# Patient Record
Sex: FEMALE | Race: BLACK OR AFRICAN AMERICAN | Employment: FULL TIME | URBAN - METROPOLITAN AREA
[De-identification: names, ages, dates, MRNs, and addresses within clinical notes are randomized per-mention and may not be internally consistent; named-entity substitution may affect disease eponyms.]

---

## 2021-12-31 ENCOUNTER — HOSPITAL ENCOUNTER (EMERGENCY)
Age: 31
Discharge: HOME | End: 2021-12-31
Attending: EMERGENCY MEDICINE
Payer: COMMERCIAL

## 2021-12-31 ENCOUNTER — APPOINTMENT (OUTPATIENT)
Dept: ULTRASOUND IMAGING | Age: 31
End: 2021-12-31
Attending: PHYSICIAN ASSISTANT
Payer: COMMERCIAL

## 2021-12-31 VITALS
DIASTOLIC BLOOD PRESSURE: 82 MMHG | HEIGHT: 68 IN | RESPIRATION RATE: 16 BRPM | TEMPERATURE: 97.8 F | BODY MASS INDEX: 32.58 KG/M2 | WEIGHT: 215 LBS | SYSTOLIC BLOOD PRESSURE: 120 MMHG | OXYGEN SATURATION: 100 % | HEART RATE: 88 BPM

## 2021-12-31 DIAGNOSIS — N93.9 ABNORMAL UTERINE BLEEDING: Primary | ICD-10-CM

## 2021-12-31 DIAGNOSIS — R10.2 PELVIC PAIN: ICD-10-CM

## 2021-12-31 LAB
ABO + RH BLD: NORMAL
ANION GAP SERPL CALC-SCNC: 7 MMOL/L (ref 3–18)
BASOPHILS # BLD: 0 K/UL (ref 0–0.1)
BASOPHILS NFR BLD: 1 % (ref 0–2)
BLOOD GROUP ANTIBODIES SERPL: NORMAL
BUN SERPL-MCNC: 16 MG/DL (ref 7–18)
BUN/CREAT SERPL: 13 (ref 12–20)
CALCIUM SERPL-MCNC: 8.7 MG/DL (ref 8.5–10.1)
CHLORIDE SERPL-SCNC: 111 MMOL/L (ref 100–111)
CO2 SERPL-SCNC: 21 MMOL/L (ref 21–32)
CREAT SERPL-MCNC: 1.22 MG/DL (ref 0.6–1.3)
DIFFERENTIAL METHOD BLD: NORMAL
EOSINOPHIL # BLD: 0.1 K/UL (ref 0–0.4)
EOSINOPHIL NFR BLD: 1 % (ref 0–5)
ERYTHROCYTE [DISTWIDTH] IN BLOOD BY AUTOMATED COUNT: 14.5 % (ref 11.6–14.5)
GLUCOSE SERPL-MCNC: 85 MG/DL (ref 74–99)
HCG SERPL QL: NEGATIVE
HCT VFR BLD AUTO: 38.8 % (ref 35–45)
HGB BLD-MCNC: 13.1 G/DL (ref 12–16)
IMM GRANULOCYTES # BLD AUTO: 0 K/UL (ref 0–0.04)
IMM GRANULOCYTES NFR BLD AUTO: 0 % (ref 0–0.5)
LYMPHOCYTES # BLD: 3.3 K/UL (ref 0.9–3.6)
LYMPHOCYTES NFR BLD: 42 % (ref 21–52)
MCH RBC QN AUTO: 28.7 PG (ref 24–34)
MCHC RBC AUTO-ENTMCNC: 33.8 G/DL (ref 31–37)
MCV RBC AUTO: 85.1 FL (ref 78–100)
MONOCYTES # BLD: 0.5 K/UL (ref 0.05–1.2)
MONOCYTES NFR BLD: 7 % (ref 3–10)
NEUTS SEG # BLD: 3.9 K/UL (ref 1.8–8)
NEUTS SEG NFR BLD: 50 % (ref 40–73)
NRBC # BLD: 0 K/UL (ref 0–0.01)
NRBC BLD-RTO: 0 PER 100 WBC
PLATELET # BLD AUTO: 408 K/UL (ref 135–420)
PMV BLD AUTO: 9.8 FL (ref 9.2–11.8)
POTASSIUM SERPL-SCNC: 3.4 MMOL/L (ref 3.5–5.5)
RBC # BLD AUTO: 4.56 M/UL (ref 4.2–5.3)
SODIUM SERPL-SCNC: 139 MMOL/L (ref 136–145)
SPECIMEN EXP DATE BLD: NORMAL
WBC # BLD AUTO: 7.8 K/UL (ref 4.6–13.2)

## 2021-12-31 PROCEDURE — 85025 COMPLETE CBC W/AUTO DIFF WBC: CPT

## 2021-12-31 PROCEDURE — 84703 CHORIONIC GONADOTROPIN ASSAY: CPT

## 2021-12-31 PROCEDURE — 99283 EMERGENCY DEPT VISIT LOW MDM: CPT

## 2021-12-31 PROCEDURE — 86900 BLOOD TYPING SEROLOGIC ABO: CPT

## 2021-12-31 PROCEDURE — 80048 BASIC METABOLIC PNL TOTAL CA: CPT

## 2021-12-31 PROCEDURE — 76856 US EXAM PELVIC COMPLETE: CPT

## 2021-12-31 PROCEDURE — 74011250636 HC RX REV CODE- 250/636: Performed by: PHYSICIAN ASSISTANT

## 2021-12-31 PROCEDURE — 87591 N.GONORRHOEAE DNA AMP PROB: CPT

## 2021-12-31 PROCEDURE — 96374 THER/PROPH/DIAG INJ IV PUSH: CPT

## 2021-12-31 PROCEDURE — 74011250637 HC RX REV CODE- 250/637: Performed by: PHYSICIAN ASSISTANT

## 2021-12-31 RX ORDER — KETOROLAC TROMETHAMINE 15 MG/ML
15 INJECTION, SOLUTION INTRAMUSCULAR; INTRAVENOUS
Status: COMPLETED | OUTPATIENT
Start: 2021-12-31 | End: 2021-12-31

## 2021-12-31 RX ORDER — ACETAMINOPHEN 325 MG/1
650 TABLET ORAL
Status: COMPLETED | OUTPATIENT
Start: 2021-12-31 | End: 2021-12-31

## 2021-12-31 RX ORDER — ERGOCALCIFEROL 1.25 MG/1
50000 CAPSULE ORAL
COMMUNITY

## 2021-12-31 RX ORDER — TOPIRAMATE 50 MG/1
TABLET, FILM COATED ORAL 2 TIMES DAILY
COMMUNITY

## 2021-12-31 RX ADMIN — KETOROLAC TROMETHAMINE 15 MG: 15 INJECTION, SOLUTION INTRAMUSCULAR; INTRAVENOUS at 19:19

## 2021-12-31 RX ADMIN — SODIUM CHLORIDE 1000 ML: 9 INJECTION, SOLUTION INTRAVENOUS at 17:50

## 2021-12-31 RX ADMIN — ACETAMINOPHEN 650 MG: 325 TABLET ORAL at 17:53

## 2021-12-31 NOTE — ED PROVIDER NOTES
EMERGENCY DEPARTMENT HISTORY AND PHYSICAL EXAM    Date: 12/31/2021  Patient Name: Winifred Navarro    History of Presenting Illness     Chief Complaint   Patient presents with    Vaginal Bleeding         History Provided By: Patient    5:20 PM  Winifred Navarro is a 27 y.o. female with PMHX of PCOS who presents to the emergency department C/O heavy vaginal bleeding cramping which began yesterday and worsened today with associated nausea. Had mild diarrhea yesterday. Patient reports history of PCOS, has very irregular periods. Over the last 6 months she has had very frequent brownish-red spotting, saw her PCP for this and completed a 10-day course of progesterone 10 mg which she finished 3 days ago. She has not had intercourse for 3 months. She states she has an appointment with OB/GYN Dr. Miguel Ángel London on 1/6/2022. Pt denies fever, dysuria, and any other sxs or complaints. PCP: Nima, MD Gold    Current Outpatient Medications   Medication Sig Dispense Refill    topiramate (Topamax) 50 mg tablet Take  by mouth two (2) times a day.  ergocalciferol (Vitamin D2) 1,250 mcg (50,000 unit) capsule Take 50,000 Units by mouth. Past History     Past Medical History:  Past Medical History:   Diagnosis Date    Migraine        Past Surgical History:  History reviewed. No pertinent surgical history. Family History:  History reviewed. No pertinent family history. Social History:  Social History     Tobacco Use    Smoking status: Never Smoker    Smokeless tobacco: Never Used   Substance Use Topics    Alcohol use: Yes     Comment: socially    Drug use: Not Currently       Allergies:  No Known Allergies      Review of Systems   Review of Systems   Constitutional: Negative for fever. Gastrointestinal: Positive for abdominal pain. Genitourinary: Positive for pelvic pain and vaginal bleeding. Negative for dysuria. All other systems reviewed and are negative.         Physical Exam     Vitals: 12/31/21 1653 12/31/21 1833 12/31/21 2115   BP: (!) 144/103 115/86 120/82   Pulse: 80 77 88   Resp: 17 18 16   Temp: 97.8 °F (36.6 °C)     SpO2: 99% 100% 100%   Weight: 97.5 kg (215 lb)     Height: 5' 8\" (1.727 m)       Physical Exam  Vital signs and nursing notes reviewed. CONSTITUTIONAL: Alert. Well-appearing; well-nourished; appears uncomfortable, tearful in moderate pain distress. HEAD: Normocephalic; atraumatic. CV: Normal S1, S2; no murmurs, rubs, or gallops. No chest wall tenderness. RESPIRATORY: Normal chest excursion with respiration; breath sounds clear and equal bilaterally; no wheezes, rhonchi, or rales. GI: Normal bowel sounds; non-distended; generalized lower abdominal tenderness.; no guarding or rigidity; no palpable organomegaly. No CVA tenderness. PELVIC: Normal external genitalia, without rash or lesions. Speculum exam: Normal appearing cervix. Moderate blood, no other discharge noted. Vaginal walls without lesion. NEURO: A & O x3. PSYCH:  Mood and affect appropriate. Diagnostic Study Results     Labs -     No results found for this or any previous visit (from the past 12 hour(s)). Radiologic Studies   US PELV NON OB W TV W DOPPLER   Final Result      No significant abnormality. No findings to explain pain. CT Results  (Last 48 hours)    None        CXR Results  (Last 48 hours)    None          Medications given in the ED-  Medications   sodium chloride 0.9 % bolus infusion 1,000 mL (0 mL IntraVENous IV Completed 12/31/21 2022)   acetaminophen (TYLENOL) tablet 650 mg (650 mg Oral Given 12/31/21 1753)   ketorolac (TORADOL) injection 15 mg (15 mg IntraVENous Given 12/31/21 1919)         Medical Decision Making   I am the first provider for this patient. I reviewed the vital signs, available nursing notes, past medical history, past surgical history, family history and social history. Vital Signs-Reviewed the patient's vital signs.     Records Reviewed: Nursing Notes      Procedures:  Procedures    ED Course:  5:20 PM   Initial assessment performed. The patients presenting problems have been discussed, and they are in agreement with the care plan formulated and outlined with them. I have encouraged them to ask questions as they arise throughout their visit. 7:53 PM  Patient's presentation, labs/imaging and plan of care was reviewed with Ritika Fontenot PA-C as part of sign out. They will await pelvic US and reassess as part of the plan discussed with the patient. SARA Lopez's assistance in completion of this plan is greatly appreciated but it should be noted that I will be the provider of record for this patient. Written by SARA Lim    Patient's US came back negative. Patient informed of result. Patient has a scheduled appointment to see OBGYN soon regarding her vaginal bleeding. Diagnosis and Disposition       DISCHARGE NOTE:    Tonia Byers's  results have been reviewed with her. She has been counseled regarding her diagnosis, treatment, and plan. She verbally conveys understanding and agreement of the signs, symptoms, diagnosis, treatment and prognosis and additionally agrees to follow up as discussed. She also agrees with the care-plan and conveys that all of her questions have been answered. I have also provided discharge instructions for her that include: educational information regarding their diagnosis and treatment, and list of reasons why they would want to return to the ED prior to their follow-up appointment, should her condition change. She has been provided with education for proper emergency department utilization. CLINICAL IMPRESSION:    1. Abnormal uterine bleeding    2. Pelvic pain        PLAN:  1. D/C Home  2. Discharge Medication List as of 12/31/2021  9:10 PM        3.    Follow-up Information     Follow up With Specialties Details Why Contact Info    OB/GYN   Keep appointment with OB/GYN _______________________________      Please note that this dictation was completed with [employer], the computer voice recognition software. Quite often unanticipated grammatical, syntax, homophones, and other interpretive errors are inadvertently transcribed by the computer software. Please disregard these errors. Please excuse any errors that have escaped final proofreading.

## 2021-12-31 NOTE — ED NOTES
Report given to [facility] ([facility]). All questions answered.

## 2021-12-31 NOTE — ED TRIAGE NOTES
Ambulatory patient arrives with complaints of abnormal menstrual cycle that began in June and is currently wearing super tampons and pads, changing every hour, c/o pain

## 2022-01-01 NOTE — DISCHARGE INSTRUCTIONS
Pelvic rest  Tylenol/Motrin for pain  Keep appointment with OB/GYN on January 6, 2022  Worse?   Return to ER

## 2022-01-13 LAB
C TRACH RRNA SPEC QL NAA+PROBE: NEGATIVE
N GONORRHOEA RRNA SPEC QL NAA+PROBE: NEGATIVE
SPECIMEN SOURCE: NORMAL
T VAGINALIS RRNA SPEC QL NAA+PROBE: NEGATIVE

## 2023-01-07 ENCOUNTER — APPOINTMENT (OUTPATIENT)
Dept: CT IMAGING | Age: 33
End: 2023-01-07
Attending: PHYSICIAN ASSISTANT
Payer: COMMERCIAL

## 2023-01-07 ENCOUNTER — HOSPITAL ENCOUNTER (EMERGENCY)
Age: 33
Discharge: HOME | End: 2023-01-07
Attending: EMERGENCY MEDICINE
Payer: COMMERCIAL

## 2023-01-07 ENCOUNTER — APPOINTMENT (OUTPATIENT)
Dept: ULTRASOUND IMAGING | Age: 33
End: 2023-01-07
Attending: PHYSICIAN ASSISTANT
Payer: COMMERCIAL

## 2023-01-07 VITALS
RESPIRATION RATE: 12 BRPM | HEART RATE: 91 BPM | BODY MASS INDEX: 32.69 KG/M2 | WEIGHT: 215 LBS | TEMPERATURE: 97.5 F | OXYGEN SATURATION: 97 % | SYSTOLIC BLOOD PRESSURE: 154 MMHG | DIASTOLIC BLOOD PRESSURE: 93 MMHG

## 2023-01-07 DIAGNOSIS — N73.0 PID (ACUTE PELVIC INFLAMMATORY DISEASE): Primary | ICD-10-CM

## 2023-01-07 LAB
ALBUMIN SERPL-MCNC: 3.5 G/DL (ref 3.4–5)
ALBUMIN/GLOB SERPL: 0.8 (ref 0.8–1.7)
ALP SERPL-CCNC: 70 U/L (ref 45–117)
ALT SERPL-CCNC: 25 U/L (ref 13–56)
ANION GAP SERPL CALC-SCNC: 3 MMOL/L (ref 3–18)
APPEARANCE UR: CLEAR
AST SERPL-CCNC: 21 U/L (ref 10–38)
BASOPHILS # BLD: 0.1 K/UL (ref 0–0.1)
BASOPHILS NFR BLD: 1 % (ref 0–2)
BILIRUB SERPL-MCNC: 0.5 MG/DL (ref 0.2–1)
BILIRUB UR QL: NEGATIVE
BUN SERPL-MCNC: 11 MG/DL (ref 7–18)
BUN/CREAT SERPL: 9 (ref 12–20)
CALCIUM SERPL-MCNC: 9 MG/DL (ref 8.5–10.1)
CHLORIDE SERPL-SCNC: 107 MMOL/L (ref 100–111)
CO2 SERPL-SCNC: 27 MMOL/L (ref 21–32)
COLOR UR: YELLOW
CREAT SERPL-MCNC: 1.26 MG/DL (ref 0.6–1.3)
DIFFERENTIAL METHOD BLD: ABNORMAL
EOSINOPHIL # BLD: 0.1 K/UL (ref 0–0.4)
EOSINOPHIL NFR BLD: 1 % (ref 0–5)
ERYTHROCYTE [DISTWIDTH] IN BLOOD BY AUTOMATED COUNT: 16.4 % (ref 11.6–14.5)
GLOBULIN SER CALC-MCNC: 4.4 G/DL (ref 2–4)
GLUCOSE SERPL-MCNC: 87 MG/DL (ref 74–99)
GLUCOSE UR STRIP.AUTO-MCNC: NEGATIVE MG/DL
HCG UR QL: NEGATIVE
HCT VFR BLD AUTO: 39.4 % (ref 35–45)
HGB BLD-MCNC: 13.1 G/DL (ref 12–16)
HGB UR QL STRIP: NEGATIVE
IMM GRANULOCYTES # BLD AUTO: 0 K/UL (ref 0–0.04)
IMM GRANULOCYTES NFR BLD AUTO: 0 % (ref 0–0.5)
KETONES UR QL STRIP.AUTO: ABNORMAL MG/DL
LEUKOCYTE ESTERASE UR QL STRIP.AUTO: NEGATIVE
LIPASE SERPL-CCNC: 99 U/L (ref 73–393)
LYMPHOCYTES # BLD: 3.4 K/UL (ref 0.9–3.6)
LYMPHOCYTES NFR BLD: 38 % (ref 21–52)
MCH RBC QN AUTO: 27.9 PG (ref 24–34)
MCHC RBC AUTO-ENTMCNC: 33.2 G/DL (ref 31–37)
MCV RBC AUTO: 84 FL (ref 78–100)
MONOCYTES # BLD: 0.7 K/UL (ref 0.05–1.2)
MONOCYTES NFR BLD: 8 % (ref 3–10)
NEUTS SEG # BLD: 4.6 K/UL (ref 1.8–8)
NEUTS SEG NFR BLD: 52 % (ref 40–73)
NITRITE UR QL STRIP.AUTO: NEGATIVE
NRBC # BLD: 0 K/UL (ref 0–0.01)
NRBC BLD-RTO: 0 PER 100 WBC
PH UR STRIP: 5.5 (ref 5–8)
PLATELET # BLD AUTO: 307 K/UL (ref 135–420)
PMV BLD AUTO: 10.5 FL (ref 9.2–11.8)
POTASSIUM SERPL-SCNC: 4.1 MMOL/L (ref 3.5–5.5)
PROT SERPL-MCNC: 7.9 G/DL (ref 6.4–8.2)
PROT UR STRIP-MCNC: NEGATIVE MG/DL
RBC # BLD AUTO: 4.69 M/UL (ref 4.2–5.3)
SERVICE CMNT-IMP: NORMAL
SODIUM SERPL-SCNC: 137 MMOL/L (ref 136–145)
SP GR UR REFRACTOMETRY: 1.02 (ref 1–1.03)
UROBILINOGEN UR QL STRIP.AUTO: 0.2 EU/DL (ref 0.2–1)
WBC # BLD AUTO: 8.9 K/UL (ref 4.6–13.2)
WET PREP GENITAL: NORMAL

## 2023-01-07 PROCEDURE — 76830 TRANSVAGINAL US NON-OB: CPT

## 2023-01-07 PROCEDURE — 87491 CHLMYD TRACH DNA AMP PROBE: CPT

## 2023-01-07 PROCEDURE — 74011250636 HC RX REV CODE- 250/636: Performed by: PHYSICIAN ASSISTANT

## 2023-01-07 PROCEDURE — 74011000636 HC RX REV CODE- 636: Performed by: EMERGENCY MEDICINE

## 2023-01-07 PROCEDURE — 74011000258 HC RX REV CODE- 258: Performed by: PHYSICIAN ASSISTANT

## 2023-01-07 PROCEDURE — 85025 COMPLETE CBC W/AUTO DIFF WBC: CPT

## 2023-01-07 PROCEDURE — 96361 HYDRATE IV INFUSION ADD-ON: CPT

## 2023-01-07 PROCEDURE — 81003 URINALYSIS AUTO W/O SCOPE: CPT

## 2023-01-07 PROCEDURE — 81025 URINE PREGNANCY TEST: CPT

## 2023-01-07 PROCEDURE — 99285 EMERGENCY DEPT VISIT HI MDM: CPT

## 2023-01-07 PROCEDURE — 80053 COMPREHEN METABOLIC PANEL: CPT

## 2023-01-07 PROCEDURE — 96375 TX/PRO/DX INJ NEW DRUG ADDON: CPT

## 2023-01-07 PROCEDURE — 87210 SMEAR WET MOUNT SALINE/INK: CPT

## 2023-01-07 PROCEDURE — 96374 THER/PROPH/DIAG INJ IV PUSH: CPT

## 2023-01-07 PROCEDURE — 83690 ASSAY OF LIPASE: CPT

## 2023-01-07 PROCEDURE — 74177 CT ABD & PELVIS W/CONTRAST: CPT

## 2023-01-07 PROCEDURE — 74011250637 HC RX REV CODE- 250/637: Performed by: PHYSICIAN ASSISTANT

## 2023-01-07 RX ORDER — METRONIDAZOLE 500 MG/1
500 TABLET ORAL 2 TIMES DAILY
Qty: 20 TABLET | Refills: 0 | Status: SHIPPED | OUTPATIENT
Start: 2023-01-07 | End: 2023-01-17

## 2023-01-07 RX ORDER — CEFTRIAXONE 250 MG/8ML
500 INJECTION, POWDER, FOR SOLUTION INTRAMUSCULAR; INTRAVENOUS ONCE
Status: DISCONTINUED | OUTPATIENT
Start: 2023-01-07 | End: 2023-01-07

## 2023-01-07 RX ORDER — METRONIDAZOLE 250 MG/1
500 TABLET ORAL
Status: COMPLETED | OUTPATIENT
Start: 2023-01-07 | End: 2023-01-07

## 2023-01-07 RX ORDER — DOXYCYCLINE HYCLATE 100 MG
100 TABLET ORAL 2 TIMES DAILY
Qty: 14 TABLET | Refills: 0 | Status: SHIPPED | OUTPATIENT
Start: 2023-01-07 | End: 2023-01-14

## 2023-01-07 RX ORDER — DOXYCYCLINE 100 MG/1
100 CAPSULE ORAL
Status: COMPLETED | OUTPATIENT
Start: 2023-01-07 | End: 2023-01-07

## 2023-01-07 RX ORDER — KETOROLAC TROMETHAMINE 30 MG/ML
15 INJECTION, SOLUTION INTRAMUSCULAR; INTRAVENOUS ONCE
Status: COMPLETED | OUTPATIENT
Start: 2023-01-07 | End: 2023-01-07

## 2023-01-07 RX ADMIN — SODIUM CHLORIDE 1000 ML: 9 INJECTION, SOLUTION INTRAVENOUS at 13:17

## 2023-01-07 RX ADMIN — DOXYCYCLINE 100 MG: 100 CAPSULE ORAL at 16:51

## 2023-01-07 RX ADMIN — KETOROLAC TROMETHAMINE 15 MG: 30 INJECTION, SOLUTION INTRAMUSCULAR at 13:34

## 2023-01-07 RX ADMIN — METRONIDAZOLE 500 MG: 250 TABLET ORAL at 16:51

## 2023-01-07 RX ADMIN — CEFTRIAXONE 500 MG: 500 INJECTION, POWDER, FOR SOLUTION INTRAMUSCULAR; INTRAVENOUS at 16:52

## 2023-01-07 RX ADMIN — IOPAMIDOL 100 ML: 612 INJECTION, SOLUTION INTRAVENOUS at 13:39

## 2023-01-07 NOTE — ED PROVIDER NOTES
EMERGENCY DEPARTMENT HISTORY & PHYSICAL EXAM    [facility]  1/7/2023, 12:33 PM    Clinical Impression:  1. PID (acute pelvic inflammatory disease)        Assessment/Differential Diagnosis:     Ddx intra-abdominal pathology, colitis, appendicitis, renal colic, pelvic pathology, ovarian cyst, ovarian torsion, PID, TOA, pregnancy all considered    ED Course:   Initial assessment performed. The patients presenting problems have been discussed, and they are in agreement with the care plan formulated and outlined with them. I have encouraged them to ask questions as they arise throughout their visit. Pt here with day 2 of LLQ abd pain, waxes and wanes, worse with change in position, palpation to area. No fever, n/v/d, trauma, urinary symptoms, vag discharge. H/O PCOS. Exam with pt appearing uncomfortable, nontoxic. Vitals reassuring. Exam with + tenderness LLQ, no rebound, mass. No low pelvic pain with palpation. Pt declines pain medication  Will give IVF, and check labs, UA, CT a/p. Workup with labs with no acute findings, CT neg, U/S with no acute findings. Pelvic exam with CMT, no mass. + discharge, no lesions    Toradol given. Rocephin IV given, doxy and flagyl given. Discussed PID with pt, and need for follow up and further testing/retesting      Medical Chart Review:  I have reviewed triage nursing documentation. Review of old medical records with the following pertinent information:       Disposition:  Home  in good condition. Chief Complaint   Patient presents with    Abdominal Pain     HPI:    The history is provided by patient. No  used. Jose J Johns is a 34 y.o. female presenting to the Emergency Department with complaints of abdominal pain. Patient brought her self to the ED. Patient states yesterday she started with an achiness in her left lower quadrant.   She states she has episodes of severe sharp pain and then waxes and wanes with a dull ache. Pain seems worse with movement and palpation to the area. There is been no trauma, fever, nausea, vomiting, diarrhea, urinary symptoms or vaginal discharge. Patient does have history of PCOS, has abnormal menstrual cycles due to this, is sexually active, no concern for STD. Denies pregnancy. No history of kidney stones. No previous similar pain. No medication taken today for her pain. Patient states she is healthy with no chronic medical problems with exception of PCOS, takes no chronic medications  Patient does not smoke, drinks alcohol occasionally, denies drug use  + sexually active, unprotected, with 1 male partner. I have reviewed all PMHX, FMHX and Social Hx as entered into the medical record in the chart below using the Epic Template. Review of Systems:  Constitutional:  Neg for fever,  chills, weight changes. ENT:  Neg for Sore throat, runny nose, or other URI symptoms  Respiratory:  neg for cough, no shortness of breath, or wheezing  Cardiovascular:  No chest pain, chest pressure, palpitations. GI:  no vomiting, no diarrhea, + abdominal pain. : no dysuria, no frequency, no urgency. No Flank pain. MSK no joint pain, no myalgias  Integumentary: no rashes, lesions, or skin irritation. Neurological: no headaches, dizziness  All other systems reviewed negative except was is positive in ROS and HPI. Past Medical History:  Past Medical History:   Diagnosis Date    Migraine        Past Surgical History:  No past surgical history on file. Family History:  No family history on file.     Social History:  Social History     Tobacco Use    Smoking status: Never    Smokeless tobacco: Never   Substance Use Topics    Alcohol use: Yes     Comment: socially    Drug use: Not Currently       Allergies:  No Known Allergies    Vital Signs:  Vitals:    01/07/23 1134   BP: (!) 154/93   Pulse: 91   Resp: 12   Temp: 97.5 °F (36.4 °C)   SpO2: 97%   Weight: 97.5 kg (215 lb) Physical Exam:  Constitutional:  Well developed, well nourished patient. Pt appears uncomfortable, nontoxic  Head: Normocephalic, Atraumatic  Eyes: Conjunctiva clear, lids normal. Sclera anicteric. PERRL.  ENT:  gross hearing normal, throat normal   Neck:  Supple, FROM. Trachea midline. No nuchal rigidity. Lungs: Lungs CTAB. No wheezes, rales or rhonchi. No respiratory distress, tachypnea or accessory muscle use. No cough. Cardiac:  RRR without murmur. No peripheral edema  Abdomen: soft, normal BS, + LLQ pain with palpation, no mass  : external genitalia normal lesions or signs of trauma. Vaginal vault with discharge. No lesions. No blood. Cervix with CMT. No adnexal mass.  + CMT/ point tenderness with bimanual exam.   Extremities:  no pedal edema  Neuro:  A&O, no obvious focal neuro deficit  Skin:  Warm, dry, no rash  Back:  No CVAT      Diagnostics:    Labs -     Recent Results (from the past 12 hour(s))   URINALYSIS W/ RFLX MICROSCOPIC    Collection Time: 01/07/23 11:35 AM   Result Value Ref Range    Color YELLOW      Appearance CLEAR      Specific gravity 1.023 1.005 - 1.030      pH (UA) 5.5 5.0 - 8.0      Protein Negative NEG mg/dL    Glucose Negative NEG mg/dL    Ketone TRACE (A) NEG mg/dL    Bilirubin Negative NEG      Blood Negative NEG      Urobilinogen 0.2 0.2 - 1.0 EU/dL    Nitrites Negative NEG      Leukocyte Esterase Negative NEG     HCG URINE, QL    Collection Time: 01/07/23 11:35 AM   Result Value Ref Range    HCG urine, QL Negative NEG     CBC WITH AUTOMATED DIFF    Collection Time: 01/07/23 12:55 PM   Result Value Ref Range    WBC 8.9 4.6 - 13.2 K/uL    RBC 4.69 4.20 - 5.30 M/uL    HGB 13.1 12.0 - 16.0 g/dL    HCT 39.4 35.0 - 45.0 %    MCV 84.0 78.0 - 100.0 FL    MCH 27.9 24.0 - 34.0 PG    MCHC 33.2 31.0 - 37.0 g/dL    RDW 16.4 (H) 11.6 - 14.5 %    PLATELET 250 340 - 085 K/uL    MPV 10.5 9.2 - 11.8 FL    NRBC 0.0 0  WBC    ABSOLUTE NRBC 0.00 0.00 - 0.01 K/uL    NEUTROPHILS 52 40 - 73 %    LYMPHOCYTES 38 21 - 52 %    MONOCYTES 8 3 - 10 %    EOSINOPHILS 1 0 - 5 %    BASOPHILS 1 0 - 2 %    IMMATURE GRANULOCYTES 0 0.0 - 0.5 %    ABS. NEUTROPHILS 4.6 1.8 - 8.0 K/UL    ABS. LYMPHOCYTES 3.4 0.9 - 3.6 K/UL    ABS. MONOCYTES 0.7 0.05 - 1.2 K/UL    ABS. EOSINOPHILS 0.1 0.0 - 0.4 K/UL    ABS. BASOPHILS 0.1 0.0 - 0.1 K/UL    ABS. IMM. GRANS. 0.0 0.00 - 0.04 K/UL    DF AUTOMATED     METABOLIC PANEL, COMPREHENSIVE    Collection Time: 01/07/23 12:55 PM   Result Value Ref Range    Sodium 137 136 - 145 mmol/L    Potassium 4.1 3.5 - 5.5 mmol/L    Chloride 107 100 - 111 mmol/L    CO2 27 21 - 32 mmol/L    Anion gap 3 3.0 - 18 mmol/L    Glucose 87 74 - 99 mg/dL    BUN 11 7.0 - 18 MG/DL    Creatinine 1.26 0.6 - 1.3 MG/DL    BUN/Creatinine ratio 9 (L) 12 - 20      eGFR 58 (L) >60 ml/min/1.73m2    Calcium 9.0 8.5 - 10.1 MG/DL    Bilirubin, total 0.5 0.2 - 1.0 MG/DL    ALT (SGPT) 25 13 - 56 U/L    AST (SGOT) 21 10 - 38 U/L    Alk. phosphatase 70 45 - 117 U/L    Protein, total 7.9 6.4 - 8.2 g/dL    Albumin 3.5 3.4 - 5.0 g/dL    Globulin 4.4 (H) 2.0 - 4.0 g/dL    A-G Ratio 0.8 0.8 - 1.7     LIPASE    Collection Time: 01/07/23 12:55 PM   Result Value Ref Range    Lipase 99 73 - 393 U/L   WET PREP    Collection Time: 01/07/23  1:30 PM    Specimen: Cervix   Result Value Ref Range    Special Requests: NO SPECIAL REQUESTS      Wet prep NO YEAST,TRICHOMONAS OR CLUE CELLS NOTED         Radiologic Studies -   US TRANSVAGINAL W DOPPLER   Final Result   --------------      1. No significant abnormalities. CT ABD PELV W CONT   Final Result      1. No evidence of an acute inflammatory process, or other cause for acute   abdominal pain. CT Results  (Last 48 hours)                 01/07/23 1346  CT ABD PELV W CONT Final result    Impression:      1. No evidence of an acute inflammatory process, or other cause for acute   abdominal pain.                Narrative:  EXAM: CT of the abdomen and pelvis INDICATION: Left lower quadrant abdominal pain. COMPARISON: None. TECHNIQUE: Axial CT imaging of the abdomen and pelvis were performed following   nonionic intravenous contrast. Multiplanar reformats were generated. One or more dose reduction techniques were used on this CT: automated exposure   control, adjustment of the mAs and/or kVp according to patient size, and   iterative reconstruction techniques. The specific techniques used on this CT   exam have been documented in the patient's electronic medical record.       _______________       FINDINGS:       LOWER CHEST: Unremarkable. LIVER, BILIARY: No suspicious liver lesion. No biliary dilation. Gallbladder   unremarkable. PANCREAS: No significant abnormality. SPLEEN: No significant abnormality. ADRENALS: No significant abnormality. KIDNEYS: No hydronephrosis or suspicious renal lesion. Small right renal lesion   appears to fat attenuation, most likely a benign angiomyolipoma. LYMPH NODES: No enlarged lymph nodes. GASTROINTESTINAL TRACT: No bowel dilation or wall thickening. Nondilated   appendix. VASCULATURE: Unremarkable. BONES: No fracture or suspicious bone lesion.        OTHER: None.       _______________                 CXR Results  (Last 48 hours)      None            Medications given in the ED-  Medications   sodium chloride 0.9 % bolus infusion 1,000 mL (0 mL IntraVENous IV Completed 1/7/23 1533)   iopamidoL (ISOVUE 300) 300 mg iodine /mL (61 %) contrast injection 100 mL (100 mL IntraVENous Given 1/7/23 1339)   ketorolac (TORADOL) injection 15 mg (15 mg IntraVENous Given 1/7/23 1334)   doxycycline (MONODOX) capsule 100 mg (100 mg Oral Given 1/7/23 1651)   metroNIDAZOLE (FLAGYL) tablet 500 mg (500 mg Oral Given 1/7/23 1651)   cefTRIAXone (ROCEPHIN) 500 mg in 0.9% sodium chloride (MBP/ADV) 50 mL MBP (0 g IntraVENous IV Completed 1/7/23 1659)       Please note that this dictation was completed with Dragon, the computer voice recognition software. Quite often unanticipated grammatical, syntax, homophones, and other interpretive errors are inadvertently transcribed by the computer software. Please disregard these errors. Please excuse any errors that have escaped final proofreading.

## 2023-01-07 NOTE — DISCHARGE INSTRUCTIONS
Work-up today is most suggestive of pelvic inflammatory disease. This is often caused by an STD. STD tests are pending. We will call with positive results. In the meantime we will start antibiotics to treat infection. Take all antibiotics as prescribed, take with food. Take probiotic daily  Refrain from sexual activity until you have completed all your antibiotics and have had follow-up to ensure full resolution of in the infection.   If you do test positive for STD is important that you alert your sexual partners as they will need to be tested and treated as well  Motrin, 200 mg, 3 every 8 hours for discomfort  Tylenol, 500 mg, 2 every 6-8 hours as needed for additional pain relief  Stay well-hydrated  Return to ER if you develop any new or worsening symptoms or new concerns

## 2023-01-07 NOTE — Clinical Note
[facility] [place]  [facility]  [address]  [facility] [id number] [phone]    Work/School Note    Date: 1/7/2023    To Whom It May concern:    Dilshad Antoine was seen and treated today in the emergency room by the following provider(s):  Attending Provider: Rachel Mcgee MD  Physician Assistant: Ella Martin PA-C. Dilshad Antoine is excused from work/school on 01/07/23 and 01/08/23. She is medically clear to return to work/school on 1/9/2023.        Sincerely,          Chepe Candelario PA-C

## 2023-01-09 LAB
C TRACH RRNA SPEC QL NAA+PROBE: NEGATIVE
N GONORRHOEA RRNA SPEC QL NAA+PROBE: NEGATIVE
SPECIMEN SOURCE: NORMAL